# Patient Record
Sex: MALE | Race: WHITE | NOT HISPANIC OR LATINO | Employment: FULL TIME | URBAN - METROPOLITAN AREA
[De-identification: names, ages, dates, MRNs, and addresses within clinical notes are randomized per-mention and may not be internally consistent; named-entity substitution may affect disease eponyms.]

---

## 2023-03-20 ENCOUNTER — TELEPHONE (OUTPATIENT)
Dept: SPORTS MEDICINE | Facility: CLINIC | Age: 28
End: 2023-03-20

## 2023-03-20 ENCOUNTER — HOSPITAL ENCOUNTER (EMERGENCY)
Facility: HOSPITAL | Age: 28
Discharge: HOME | End: 2023-03-20
Attending: EMERGENCY MEDICINE | Admitting: EMERGENCY MEDICINE

## 2023-03-20 ENCOUNTER — APPOINTMENT (OUTPATIENT)
Dept: CARDIOLOGY | Facility: HOSPITAL | Age: 28
End: 2023-03-20

## 2023-03-20 VITALS
BODY MASS INDEX: 20.99 KG/M2 | RESPIRATION RATE: 18 BRPM | HEART RATE: 78 BPM | HEIGHT: 72 IN | WEIGHT: 155 LBS | OXYGEN SATURATION: 97 % | SYSTOLIC BLOOD PRESSURE: 139 MMHG | TEMPERATURE: 98 F | DIASTOLIC BLOOD PRESSURE: 73 MMHG

## 2023-03-20 DIAGNOSIS — M25.561 ACUTE PAIN OF RIGHT KNEE: Primary | ICD-10-CM

## 2023-03-20 LAB
BH CV LOWER VASCULAR LEFT COMMON FEMORAL AUGMENT: NORMAL
BH CV LOWER VASCULAR LEFT COMMON FEMORAL COMPETENT: NORMAL
BH CV LOWER VASCULAR LEFT COMMON FEMORAL COMPRESS: NORMAL
BH CV LOWER VASCULAR LEFT COMMON FEMORAL PHASIC: NORMAL
BH CV LOWER VASCULAR LEFT COMMON FEMORAL SPONT: NORMAL
BH CV LOWER VASCULAR RIGHT COMMON FEMORAL AUGMENT: NORMAL
BH CV LOWER VASCULAR RIGHT COMMON FEMORAL COMPETENT: NORMAL
BH CV LOWER VASCULAR RIGHT COMMON FEMORAL COMPRESS: NORMAL
BH CV LOWER VASCULAR RIGHT COMMON FEMORAL PHASIC: NORMAL
BH CV LOWER VASCULAR RIGHT COMMON FEMORAL SPONT: NORMAL
BH CV LOWER VASCULAR RIGHT DISTAL FEMORAL COMPRESS: NORMAL
BH CV LOWER VASCULAR RIGHT GASTRONEMIUS COMPRESS: NORMAL
BH CV LOWER VASCULAR RIGHT GREATER SAPH AK COMPRESS: NORMAL
BH CV LOWER VASCULAR RIGHT GREATER SAPH BK COMPRESS: NORMAL
BH CV LOWER VASCULAR RIGHT LESSER SAPH COMPRESS: NORMAL
BH CV LOWER VASCULAR RIGHT MID FEMORAL AUGMENT: NORMAL
BH CV LOWER VASCULAR RIGHT MID FEMORAL COMPETENT: NORMAL
BH CV LOWER VASCULAR RIGHT MID FEMORAL COMPRESS: NORMAL
BH CV LOWER VASCULAR RIGHT MID FEMORAL PHASIC: NORMAL
BH CV LOWER VASCULAR RIGHT MID FEMORAL SPONT: NORMAL
BH CV LOWER VASCULAR RIGHT PERONEAL COMPRESS: NORMAL
BH CV LOWER VASCULAR RIGHT POPLITEAL AUGMENT: NORMAL
BH CV LOWER VASCULAR RIGHT POPLITEAL COMPETENT: NORMAL
BH CV LOWER VASCULAR RIGHT POPLITEAL COMPRESS: NORMAL
BH CV LOWER VASCULAR RIGHT POPLITEAL PHASIC: NORMAL
BH CV LOWER VASCULAR RIGHT POPLITEAL SPONT: NORMAL
BH CV LOWER VASCULAR RIGHT POSTERIOR TIBIAL COMPRESS: NORMAL
BH CV LOWER VASCULAR RIGHT PROFUNDA FEMORAL COMPRESS: NORMAL
BH CV LOWER VASCULAR RIGHT PROXIMAL FEMORAL COMPRESS: NORMAL
BH CV LOWER VASCULAR RIGHT SAPHENOFEMORAL JUNCTION COMPRESS: NORMAL
MAXIMAL PREDICTED HEART RATE: 192 BPM
STRESS TARGET HR: 163 BPM

## 2023-03-20 PROCEDURE — 99282 EMERGENCY DEPT VISIT SF MDM: CPT

## 2023-03-20 PROCEDURE — 93971 EXTREMITY STUDY: CPT

## 2023-03-20 NOTE — ED PROVIDER NOTES
MD ATTESTATION NOTE  I wore full protective equipment throughout this patient encounter including an N95 face mask, googles, gown and gloves. Hand hygiene was performed before donning protective equipment and after removal when leaving the room.    The MASON and I have discussed this patient's history, physical exam, and treatment plan. I have reviewed the documentation and personally had a face to face interaction with the patient. I affirm the MASON documentation and agree with their diagnostics, findings, treatment, plan, and disposition.    I provided a substantive portion of the care of this patient.  I personally performed the physical exam, in its entirety.  The attached note describes my personal findings.    PCP: Provider, No Known  Patient Care Team:  Provider, No Known as PCP -      Jese Jasso is a 28 y.o. male who presents to the ED c/o right knee pain and swelling.  Patient reports that he was hit in the right knee with a softball 10 days ago.  Patient reports that he had previous x-ray imaging that was negative.  Patient reports that the swelling was previously in his knee, began having some swelling behind his knee, was having increased pain today as well as was having some numbness at his knee, swelling has since improved and numbness has resolved.  Patient reports concern for blood clot.  Patient denies any swelling of his distal calf or foot, no chest pain or shortness of breath, patient denies any redness or hotness to touch of his leg, no fever or systemic symptoms.  No history of blood clots.  Patient reports concern for blood clot.    On exam:  General: NAD.  Head: NCAT.  ENT: nares patent, no scleral icterus  Neck: Supple, trachea midline.  Cardiac: regular rate and rhythm.  Lungs: normal effort.  Abdomen: Soft, NTTP.   Extremities: Moves all extremities well  Right lower extremity: Patient has old appearing bruising on right medial knee with some associated swelling, slight fullness  and the proximal calf, no swelling in the distal calf or foot, no palpable cords, negative Homans.  No erythema induration or warmness to touch.  Neuro: alert, MAEW, follows commands  Psych: calm, cooperative  Skin: Warm, dry.    Medical Decision Making:  After the initial H&P, I discussed pertinent information from history and physical exam with patient/family.  Discussed differential diagnosis.  Discussed plan for ED evaluation/work-up/treatment.  All questions answered.  Patient/family is agreeable with plan.    ED Course as of 03/20/23 1541   Mon Mar 20, 2023   0945 Patient is over at ultrasound at this time. [AH]   1016 I discussed the patient with the vascular .  Preliminary read shows no acute DVT or SVT.  [AH]   1043 Patient has not arrived back to his room yet from ultrasound. [AH]   1100 I discussed imaging findings with the patient which show no sign of acute DVT or SVT.  I recommended he follow-up with Ortho given how much pain he still has in the knee even after almost 2 weeks from the injury date.  Patient does have crutches for support.  He will take OTC Tylenol and or Motrin as needed until follow-up with Ortho.  Patient understands and agrees plan of care. [AH]      ED Course User Index  [AH] Naila Daly PA       Diagnosis  Final diagnoses:   None        Khalif Cary MD  03/20/23 1541

## 2023-03-20 NOTE — DISCHARGE INSTRUCTIONS
Although you are being discharged from the ED today, I encourage you to return for worsening symptoms. Things can, and do, change such that treatment at home with medication may not be adequate. Specifically I recommend returning for severe intractable pain, severe weakness of right leg, or any other worsening or alarming symptoms.     Rest.  Bear weight as tolerated  Follow up with orthopedic surgery for further evaluation and management.  Follow up with primary care provider for further management and to have blood pressure rechecked.  Take OTC Tylenol and Motrin as needed for pain.

## 2023-03-20 NOTE — ED NOTES
Pt to triage on crutches from home with c/o right knee injury 10 days ago after getting hit in the knee with a softball. Went to [facility] a week later, told had a bone bruise, then went back, told to come to ER if experiencing numbness, limited ROM, and/or swelling to lower leg.  Pt wearing mask in triage. Triage personnel wore appropriate PPE    Right knee same temp to touch as left, no swelling appreciated upon inspection

## 2023-03-20 NOTE — TELEPHONE ENCOUNTER
HUB--Ok to schedule w/Dr Smith if or when pt calls back.    Spoke w/pt to get scheduled w/Dr Smith for rt knee pain. Pt advised he will call back to schedule in a few days if needed.

## 2023-03-20 NOTE — ED PROVIDER NOTES
" EMERGENCY DEPARTMENT ENCOUNTER    Room Number:  08/08  Date seen:  3/20/2023  PCP: Provider, No Known  Discussed/ obtained information from independent historians: patient      HPI:  Chief Complaint: knee/leg pain  Context: Jese Jasso is a 28 y.o. male who presents to the ED c/o right leg pain.  Patient states almost 2 weeks ago he was hit in the right knee with a softball.  He states he initially had his knee evaluated at urgent care and was told he had a \"bone bruise\" and to follow-up.  He had ongoing pain which sent him back to the urgent care on Saturday.  He was told to go to the ER if he had ongoing issues or developed worsening pain, any numbness or tingling and recommended evaluation at the ER also for potential blood clot.  He states he has had tingling and pain on and off pain in his thigh and calf for the past couple days.  He has limited range of motion of his right knee.  He has not followed up with her made appointment with orthopedics.  He currently works as a bus or at a restaurant and states he has been unable to bear weight for any long period of time due to pain.  He denies any history of blood clots.  He denies any history of blood clotting disorders.  He is a non-smoker.  He does admit to being less active since he hurt his knee      External (non-ED) record review: Patient went to the urgent care on 3/15/2023 where he had an x-ray and was diagnosed with a bone bruise.  He returned to the urgent care on 3/18 for the same      PAST MEDICAL HISTORY  Active Ambulatory Problems     Diagnosis Date Noted   • No Active Ambulatory Problems     Resolved Ambulatory Problems     Diagnosis Date Noted   • No Resolved Ambulatory Problems     No Additional Past Medical History         PAST SURGICAL HISTORY  No past surgical history on file.      FAMILY HISTORY  No family history on file.      SOCIAL HISTORY  Social History     Socioeconomic History   • Marital status: Single         ALLERGIES  Patient " has no known allergies.        REVIEW OF SYSTEMS  Review of Systems   Constitutional: Negative for chills and fever.   Respiratory: Negative for cough and shortness of breath.    Cardiovascular: Negative for chest pain and leg swelling.   Musculoskeletal: Positive for arthralgias and joint swelling. Negative for back pain and neck pain.   Skin: Positive for color change. Negative for wound.   Neurological: Negative for numbness.        Tingling right lower extremity   Psychiatric/Behavioral: Negative for confusion.          PHYSICAL EXAM  ED Triage Vitals [03/20/23 0437]   Temp Heart Rate Resp BP SpO2   98 °F (36.7 °C) 120 18 134/85 99 %      Temp src Heart Rate Source Patient Position BP Location FiO2 (%)   Tympanic Monitor Standing Right arm --       Physical Exam      GENERAL: no acute distress  HENT: normocephalic, atraumatic  EYES: no scleral icterus  CV: regular rhythm, normal rate  RESPIRATORY: normal effort  ABDOMEN: nondistended  MUSCULOSKELETAL: no deformity.  Maybe mild swelling of the right knee compared to left.  Old yellow bruising seen in the medial aspect of the right knee.  Patient unable to fully extend right leg.  Able to flex to 90 degrees.  Tenderness of the medial aspect of the right knee.  No calf or thigh tenderness.  Negative Homans' sign.  Full sensation to light touch bilateral lower extremities.  2+ PT and DP pulses bilaterally  NEURO: alert, moves all extremities, follows commands  PSYCH:  calm, cooperative  SKIN: warm, dry    Vital signs and nursing notes reviewed.          LAB RESULTS  Recent Results (from the past 24 hour(s))   Duplex Venous Lower Extremity - Right CAR    Collection Time: 03/20/23 10:14 AM   Result Value Ref Range    Target HR (85%) 163 bpm    Max. Pred. HR (100%) 192 bpm    Right Common Femoral Spont Y     Right Common Femoral Competent Y     Right Common Femoral Phasic Y     Right Common Femoral Compress C     Right Common Femoral Augment Y     Right Saphenofemoral  Junction Compress C     Right Profunda Femoral Compress C     Right Proximal Femoral Compress C     Right Mid Femoral Spont Y     Right Mid Femoral Competent Y     Right Mid Femoral Phasic Y     Right Mid Femoral Compress C     Right Mid Femoral Augment Y     Right Distal Femoral Compress C     Right Popliteal Spont Y     Right Popliteal Competent Y     Right Popliteal Phasic Y     Right Popliteal Compress C     Right Popliteal Augment Y     Right Posterior Tibial Compress C     Right Peroneal Compress C     Right Gastronemius Compress C     Right Greater Saph AK Compress C     Right Greater Saph BK Compress C     Right Lesser Saph Compress C     Left Common Femoral Spont Y     Left Common Femoral Competent Y     Left Common Femoral Phasic Y     Left Common Femoral Compress C     Left Common Femoral Augment Y        Ordered the above labs and reviewed the results.        RADIOLOGY  Duplex Venous Lower Extremity - Right CAR    Result Date: 3/20/2023  •  Normal right lower extremity venous duplex scan.       Ordered the above noted radiological studies. Reviewed by me in PACS.                MEDICAL DECISION MAKING, PROGRESS, and CONSULTS    All labs have been independently reviewed by me.  All radiology studies have been reviewed by me and I have also reviewed the radiology report.   EKG's independently viewed and interpreted by me.  Discussion below represents my analysis of pertinent findings related to patient's condition, differential diagnosis, treatment plan and final disposition.      Orders placed during this visit:  Orders Placed This Encounter   Procedures   • Ambulatory Referral to Orthopedic Surgery         Differential diagnosis:  Contusion, internal derangement, DVT, SVT, effusion, hematoma      Independent interpretation of labs, radiology studies, and discussions with consultants:  ED Course as of 03/20/23 1433   Mon Mar 20, 2023   0945 Patient is over at ultrasound at this time. [AH]   1016 I  discussed the patient with the vascular .  Preliminary read shows no acute DVT or SVT.  [AH]   1043 Patient has not arrived back to his room yet from ultrasound. [AH]   1100 I discussed imaging findings with the patient which show no sign of acute DVT or SVT.  I recommended he follow-up with Ortho given how much pain he still has in the knee even after almost 2 weeks from the injury date.  Patient does have crutches for support.  He will take OTC Tylenol and or Motrin as needed until follow-up with Ortho.  Patient understands and agrees plan of care. [AH]      ED Course User Index  [AH] Naila Daly PA             Patient was wearing a face mask when I entered the room and they continued to wear a mask throughout their stay in the ED.  I wore PPE, including  gloves, face mask with shield or face mask with goggles whenever I was in the room with patient.     DIAGNOSIS  Final diagnoses:   Acute pain of right knee           [facility]  [facility]  [facility] [place] [address]  [phone]  Schedule an appointment as soon as possible for a visit         RX:     Medication List      No changes were made to your prescriptions during this visit.         Latest Documented Vital Signs:  As of 14:33 EDT  BP- 139/73 HR- 78 Temp- 98 °F (36.7 °C) (Tympanic) O2 sat- 97%              --    Please note that portions of this were completed with a voice recognition program.       Note Disclaimer: At [facility], we believe that sharing information builds trust and better relationships. You are receiving this note because you are receiving care at [facility] or recently visited. It is possible you will see health information before a provider has talked with you about it. This kind of information can be easy to misunderstand. To help you fully understand what it means for your health, we urge you to discuss this note with your provider.             Naila Daly PA  03/20/23  2062

## 2023-04-03 ENCOUNTER — OFFICE VISIT (OUTPATIENT)
Dept: SPORTS MEDICINE | Facility: CLINIC | Age: 28
End: 2023-04-03

## 2023-04-03 VITALS
HEIGHT: 72 IN | OXYGEN SATURATION: 98 % | WEIGHT: 155 LBS | RESPIRATION RATE: 16 BRPM | BODY MASS INDEX: 20.99 KG/M2 | SYSTOLIC BLOOD PRESSURE: 110 MMHG | HEART RATE: 85 BPM | DIASTOLIC BLOOD PRESSURE: 80 MMHG

## 2023-04-03 DIAGNOSIS — T14.8XXA CONTUSION OF BONE: Primary | ICD-10-CM

## 2023-04-03 DIAGNOSIS — M25.561 ACUTE PAIN OF RIGHT KNEE: ICD-10-CM

## 2023-04-03 NOTE — PROGRESS NOTES
"Jese is a 28 y.o. year old male presents to [facility]    Chief Complaint   Patient presents with   • Knee Pain     Referral from Dr. Valdez for eval of RT knee pain - ED eval on 03/20/2023 after being hit in the knee with a softball 10 days prior - reports having pain and swelling, ambulating with a cane today  - x-rays reported negative - here for further evaluation and treatment        History of Present Illness  Direct hit from softball when playing [employer]. Ball hit inside of knee. Had swelling but has resolved. Difficulty ambulating, fully straightening his knee. Has been icing. Ambulates w/cane, crutches at times.  He has been using cane elevated shorter than what he should be using on the ipsilateral side to ambulate.  He has also been using knee sleeve.    ED with Khalif Cary MD (03/20/2023)    I have reviewed the patient's medical, family, and social history in detail and updated the computerized patient record.    /80 (BP Location: Right arm, Patient Position: Sitting, Cuff Size: Adult)   Pulse 85   Resp 16   Ht 182.9 cm (72.01\")   Wt 70.3 kg (155 lb)   SpO2 98%   BMI 21.02 kg/m²      Physical Exam    Vital signs reviewed.   General: No acute distress.  Eyes: conjunctiva clear; pupils equally round and reactive  ENT: external ears atraumatic  CV: no peripheral edema  Resp: normal respiratory effort, no use of accessory muscles  Skin: no rashes or wounds; normal turgor  Psych: mood and affect appropriate; recent and remote memory intact  Neuro: sensation to light touch intact    MSK Exam  R knee: No effusion.  Full range of motion.  Negative patella apprehension test.  Negative anterior Lachman.  Negative medial, negative lateral Bryan.  No varus no valgus laxity.  There is bony tenderness along the distal medial femoral condyle, medial tibial plateau    Outside x-ray report 3 view right knee 3/15/2023 reviewed.  No fracture seen.  No " abnormality.             Diagnoses and all orders for this visit:    Contusion of bone    Acute pain of right knee      Contusion favored.  No gross abnormality on exam today in terms of ligamentous or meniscal pathology.  Reassurance given.  If he continues to have significant symptoms within 3 weeks time, RTO.  I did  patient on proper use of cane or crutches to the contralateral side to help to offload his affected knee.  Can continue ice, ibuprofen.      Follow Up   No follow-ups on file.  Patient was given instructions and counseling regarding his condition or for health maintenance advice. Please see specific information pulled into the AVS if appropriate.     EMR Dragon/Transcription disclaimer:    Much of this encounter note is an electronic transcription/translation of spoken language to printed text.  The electronic translation of spoken language may permit erroneous, or at times, nonsensical words or phrases to be inadvertently transcribed.  Although I have reviewed the note for such errors some may still exist.

## 2023-04-06 ENCOUNTER — PATIENT ROUNDING (BHMG ONLY) (OUTPATIENT)
Dept: SPORTS MEDICINE | Facility: CLINIC | Age: 28
End: 2023-04-06

## 2023-04-06 NOTE — PROGRESS NOTES
April 6, 2023    A Welcome Card has been sent to the patient for PATIENT ROUNDING with [facility]

## 2025-08-20 ENCOUNTER — OFFICE VISIT (OUTPATIENT)
Dept: INTERNAL MEDICINE | Facility: CLINIC | Age: 30
End: 2025-08-20
Payer: MEDICAID

## 2025-08-20 VITALS
DIASTOLIC BLOOD PRESSURE: 82 MMHG | WEIGHT: 145.8 LBS | TEMPERATURE: 99.1 F | SYSTOLIC BLOOD PRESSURE: 117 MMHG | BODY MASS INDEX: 20.41 KG/M2 | HEIGHT: 71 IN | HEART RATE: 94 BPM | OXYGEN SATURATION: 98 %

## 2025-08-20 DIAGNOSIS — Z76.89 ENCOUNTER TO ESTABLISH CARE: Primary | ICD-10-CM

## 2025-08-20 DIAGNOSIS — L85.8 KERATOSIS PILARIS: Chronic | ICD-10-CM

## 2025-08-20 DIAGNOSIS — L91.8 SKIN TAG: ICD-10-CM

## 2025-08-20 DIAGNOSIS — F33.1 MODERATE EPISODE OF RECURRENT MAJOR DEPRESSIVE DISORDER: Chronic | ICD-10-CM

## 2025-08-20 DIAGNOSIS — F90.9 ATTENTION DEFICIT HYPERACTIVITY DISORDER (ADHD), UNSPECIFIED ADHD TYPE: Chronic | ICD-10-CM

## 2025-08-20 DIAGNOSIS — G47.00 INSOMNIA, UNSPECIFIED TYPE: Chronic | ICD-10-CM

## 2025-08-20 DIAGNOSIS — F41.9 ANXIETY: Chronic | ICD-10-CM

## 2025-08-20 DIAGNOSIS — L74.512 HYPERHIDROSIS OF PALMS: Chronic | ICD-10-CM

## 2025-08-20 RX ORDER — SALICYLIC ACID 60 MG/G
1 GEL TOPICAL DAILY
Qty: 60 G | Refills: 1 | Status: SHIPPED | OUTPATIENT
Start: 2025-08-20

## 2025-08-20 RX ORDER — VILOXAZINE HYDROCHLORIDE 200 MG/1
CAPSULE, EXTENDED RELEASE ORAL
COMMUNITY
Start: 2025-08-07

## 2025-08-20 RX ORDER — HYDROXYZINE PAMOATE 25 MG/1
25 CAPSULE ORAL 3 TIMES DAILY PRN
Qty: 60 CAPSULE | Refills: 1 | Status: SHIPPED | OUTPATIENT
Start: 2025-08-20

## 2025-08-27 PROBLEM — G47.00 INSOMNIA: Chronic | Status: ACTIVE | Noted: 2025-08-27

## 2025-08-27 PROBLEM — F90.9 ATTENTION DEFICIT HYPERACTIVITY DISORDER (ADHD): Chronic | Status: ACTIVE | Noted: 2025-08-27

## 2025-08-27 PROBLEM — F41.9 ANXIETY: Chronic | Status: ACTIVE | Noted: 2025-08-27

## 2025-08-27 PROBLEM — F33.1 MODERATE EPISODE OF RECURRENT MAJOR DEPRESSIVE DISORDER: Chronic | Status: ACTIVE | Noted: 2025-08-27

## 2025-08-27 PROBLEM — L74.512 HYPERHIDROSIS OF PALMS: Chronic | Status: ACTIVE | Noted: 2025-08-27

## 2025-08-27 PROBLEM — L85.8 KERATOSIS PILARIS: Chronic | Status: ACTIVE | Noted: 2025-08-27
